# Patient Record
Sex: FEMALE | Race: WHITE | NOT HISPANIC OR LATINO
[De-identification: names, ages, dates, MRNs, and addresses within clinical notes are randomized per-mention and may not be internally consistent; named-entity substitution may affect disease eponyms.]

---

## 2020-08-14 ENCOUNTER — APPOINTMENT (OUTPATIENT)
Dept: MRI IMAGING | Facility: CLINIC | Age: 25
End: 2020-08-14

## 2020-12-09 ENCOUNTER — OUTPATIENT (OUTPATIENT)
Dept: OUTPATIENT SERVICES | Facility: HOSPITAL | Age: 25
LOS: 1 days | End: 2020-12-09
Payer: COMMERCIAL

## 2020-12-09 VITALS
DIASTOLIC BLOOD PRESSURE: 82 MMHG | SYSTOLIC BLOOD PRESSURE: 120 MMHG | HEIGHT: 69 IN | OXYGEN SATURATION: 100 % | TEMPERATURE: 99 F | WEIGHT: 171.08 LBS | HEART RATE: 76 BPM | RESPIRATION RATE: 18 BRPM

## 2020-12-09 DIAGNOSIS — Z01.818 ENCOUNTER FOR OTHER PREPROCEDURAL EXAMINATION: ICD-10-CM

## 2020-12-09 DIAGNOSIS — M24.271 DISORDER OF LIGAMENT, RIGHT ANKLE: ICD-10-CM

## 2020-12-09 DIAGNOSIS — M25.671 STIFFNESS OF RIGHT ANKLE, NOT ELSEWHERE CLASSIFIED: ICD-10-CM

## 2020-12-09 LAB
HCT VFR BLD CALC: 40.1 % — SIGNIFICANT CHANGE UP (ref 34.5–45)
HGB BLD-MCNC: 13.5 G/DL — SIGNIFICANT CHANGE UP (ref 11.5–15.5)
MCHC RBC-ENTMCNC: 28.9 PG — SIGNIFICANT CHANGE UP (ref 27–34)
MCHC RBC-ENTMCNC: 33.7 GM/DL — SIGNIFICANT CHANGE UP (ref 32–36)
MCV RBC AUTO: 85.9 FL — SIGNIFICANT CHANGE UP (ref 80–100)
NRBC # BLD: 0 /100 WBCS — SIGNIFICANT CHANGE UP (ref 0–0)
PLATELET # BLD AUTO: 294 K/UL — SIGNIFICANT CHANGE UP (ref 150–400)
RBC # BLD: 4.67 M/UL — SIGNIFICANT CHANGE UP (ref 3.8–5.2)
RBC # FLD: 11.8 % — SIGNIFICANT CHANGE UP (ref 10.3–14.5)
WBC # BLD: 6.84 K/UL — SIGNIFICANT CHANGE UP (ref 3.8–10.5)
WBC # FLD AUTO: 6.84 K/UL — SIGNIFICANT CHANGE UP (ref 3.8–10.5)

## 2020-12-09 PROCEDURE — 85027 COMPLETE CBC AUTOMATED: CPT

## 2020-12-09 PROCEDURE — G0463: CPT

## 2020-12-09 RX ORDER — LIDOCAINE HCL 20 MG/ML
0.2 VIAL (ML) INJECTION ONCE
Refills: 0 | Status: DISCONTINUED | OUTPATIENT
Start: 2020-12-18 | End: 2021-01-02

## 2020-12-09 RX ORDER — CEFAZOLIN SODIUM 1 G
2000 VIAL (EA) INJECTION ONCE
Refills: 0 | Status: DISCONTINUED | OUTPATIENT
Start: 2020-12-18 | End: 2021-01-02

## 2020-12-09 RX ORDER — CHLORHEXIDINE GLUCONATE 213 G/1000ML
1 SOLUTION TOPICAL ONCE
Refills: 0 | Status: DISCONTINUED | OUTPATIENT
Start: 2020-12-18 | End: 2021-01-02

## 2020-12-09 RX ORDER — SODIUM CHLORIDE 9 MG/ML
3 INJECTION INTRAMUSCULAR; INTRAVENOUS; SUBCUTANEOUS EVERY 8 HOURS
Refills: 0 | Status: DISCONTINUED | OUTPATIENT
Start: 2020-12-18 | End: 2021-01-02

## 2020-12-09 NOTE — H&P PST ADULT - ASSESSMENT
DEVANTEI VTE 2.0 SCORE [CLOT updated 2019]    AGE RELATED RISK FACTORS                                                       MOBILITY RELATED FACTORS  [ ] Age 41-60 years                                            (1 Point)                    [ ] Bed rest                                                        (1 Point)  [ ] Age: 61-74 years                                           (2 Points)                  [ ] Plaster cast                                                   (2 Points)  [ ] Age= 75 years                                              (3 Points)                    [ ] Bed bound for more than 72 hours                 (2 Points)    DISEASE RELATED RISK FACTORS                                               GENDER SPECIFIC FACTORS  [ ] Edema in the lower extremities                       (1 Point)              [ ] Pregnancy                                                     (1 Point)  [ ] Varicose veins                                               (1 Point)                     [ ] Post-partum < 6 weeks                                   (1 Point)             [ ] BMI > 25 Kg/m2                                            (1 Point)                     [ ] Hormonal therapy  or oral contraception          (1 Point)                 [ ] Sepsis (in the previous month)                        (1 Point)               [ ] History of pregnancy complications                 (1 point)  [ ] Pneumonia or serious lung disease                                               [ ] Unexplained or recurrent                     (1 Point)           (in the previous month)                               (1 Point)  [ ] Abnormal pulmonary function test                     (1 Point)                 SURGERY RELATED RISK FACTORS  [ ] Acute myocardial infarction                              (1 Point)               [ ]  Section                                             (1 Point)  [ ] Congestive heart failure (in the previous month)  (1 Point)      [ ] Minor surgery                                                  (1 Point)   [ ] Inflammatory bowel disease                             (1 Point)               [ ] Arthroscopic surgery                                        (2 Points)  [ ] Central venous access                                      (2 Points)                [ ] General surgery lasting more than 45 minutes (2 points)  [ ] Malignancy- Present or previous                   (2 Points)                [ ] Elective arthroplasty                                         (5 points)    [ ] Stroke (in the previous month)                          (5 Points)                                                                                                                                                           HEMATOLOGY RELATED FACTORS                                                 TRAUMA RELATED RISK FACTORS  [ ] Prior episodes of VTE                                     (3 Points)                [ ] Fracture of the hip, pelvis, or leg                       (5 Points)  [ ] Positive family history for VTE                         (3 Points)             [ ] Acute spinal cord injury (in the previous month)  (5 Points)  [ ] Prothrombin 42153 A                                     (3 Points)               [ ] Paralysis  (less than 1 month)                             (5 Points)  [ ] Factor V Leiden                                             (3 Points)                  [ ] Multiple Trauma within 1 month                        (5 Points)  [ ] Lupus anticoagulants                                     (3 Points)                                                           [ ] Anticardiolipin antibodies                               (3 Points)                                                       [ ] High homocysteine in the blood                      (3 Points)                                             [ ] Other congenital or acquired thrombophilia      (3 Points)                                                [ ] Heparin induced thrombocytopenia                  (3 Points)                                     Total Score [     1     ]

## 2020-12-09 NOTE — H&P PST ADULT - HEMATOLOGY/LYMPHATICS
pt is comfortable on vent  no new sx   pt is full code   cont current tx  overall prognosis poor negative

## 2020-12-09 NOTE — H&P PST ADULT - ATTENDING COMMENTS
This patient presents today with chronically painful and unstable right ankle 2 years duration after injury during tennis with MRI findings of pathology involving the anterior talofibular ligament with physical exam findings matching this area.  Plan for open lateral ankle ligament restabilization via direct repair of the ATFL.  No guarantees given nor implied regarding outcome, all risks benefits and alternatives explained clearly.  All questions answered and explained to her full level of satisfaction and understanding.

## 2020-12-09 NOTE — H&P PST ADULT - NSICDXPROBLEM_GEN_ALL_CORE_FT
PROBLEM DIAGNOSES  Problem: Disorder of ligament, right ankle  Assessment and Plan: Right lateral ankle ligament restabilization insertion of arthrex brace

## 2020-12-09 NOTE — H&P PST ADULT - HISTORY OF PRESENT ILLNESS
This is a 26 y/o female with no significant PMH, sprained right ankle while playing tennis 2 years ago, continues to have swelling of the ankle.  Presents today for right lateral ankle ligament retstabilization, insertion of arthrex brace.

## 2020-12-09 NOTE — H&P PST ADULT - NSANTHOSAYNRD_GEN_A_CORE
No. NELLY screening performed.  STOP BANG Legend: 0-2 = LOW Risk; 3-4 = INTERMEDIATE Risk; 5-8 = HIGH Risk

## 2020-12-15 PROBLEM — Z78.9 OTHER SPECIFIED HEALTH STATUS: Chronic | Status: ACTIVE | Noted: 2020-12-09

## 2020-12-16 ENCOUNTER — TRANSCRIPTION ENCOUNTER (OUTPATIENT)
Age: 25
End: 2020-12-16

## 2020-12-17 ENCOUNTER — TRANSCRIPTION ENCOUNTER (OUTPATIENT)
Age: 25
End: 2020-12-17

## 2020-12-18 ENCOUNTER — OUTPATIENT (OUTPATIENT)
Dept: OUTPATIENT SERVICES | Facility: HOSPITAL | Age: 25
LOS: 1 days | End: 2020-12-18
Payer: COMMERCIAL

## 2020-12-18 VITALS
HEIGHT: 69 IN | SYSTOLIC BLOOD PRESSURE: 111 MMHG | OXYGEN SATURATION: 100 % | DIASTOLIC BLOOD PRESSURE: 70 MMHG | TEMPERATURE: 97 F | WEIGHT: 171.08 LBS | RESPIRATION RATE: 20 BRPM | HEART RATE: 83 BPM

## 2020-12-18 VITALS
RESPIRATION RATE: 14 BRPM | OXYGEN SATURATION: 100 % | HEART RATE: 98 BPM | SYSTOLIC BLOOD PRESSURE: 121 MMHG | DIASTOLIC BLOOD PRESSURE: 67 MMHG

## 2020-12-18 DIAGNOSIS — M24.271 DISORDER OF LIGAMENT, RIGHT ANKLE: ICD-10-CM

## 2020-12-18 PROCEDURE — C1713: CPT

## 2020-12-18 PROCEDURE — 27695 REPAIR OF ANKLE LIGAMENT: CPT | Mod: RT

## 2020-12-18 RX ORDER — ONDANSETRON 8 MG/1
4 TABLET, FILM COATED ORAL ONCE
Refills: 0 | Status: COMPLETED | OUTPATIENT
Start: 2020-12-18 | End: 2020-12-18

## 2020-12-18 RX ORDER — NORETHINDRONE AND ETHINYL ESTRADIOL 0.4-0.035
1 KIT ORAL
Qty: 0 | Refills: 0 | DISCHARGE

## 2020-12-18 RX ORDER — OXYCODONE HYDROCHLORIDE 5 MG/1
5 TABLET ORAL ONCE
Refills: 0 | Status: DISCONTINUED | OUTPATIENT
Start: 2020-12-18 | End: 2020-12-18

## 2020-12-18 RX ORDER — SODIUM CHLORIDE 9 MG/ML
1000 INJECTION, SOLUTION INTRAVENOUS
Refills: 0 | Status: DISCONTINUED | OUTPATIENT
Start: 2020-12-18 | End: 2021-01-02

## 2020-12-18 RX ADMIN — ONDANSETRON 4 MILLIGRAM(S): 8 TABLET, FILM COATED ORAL at 16:50

## 2020-12-18 NOTE — ASU DISCHARGE PLAN (ADULT/PEDIATRIC) - ASU DC SPECIAL INSTRUCTIONSFT
Non weight bearing to right lower extremity. Keep splint clean, dry , and intact until follow up appointment. Follow up with Dr. Espinosa as scheduled.

## 2020-12-18 NOTE — ASU DISCHARGE PLAN (ADULT/PEDIATRIC) - NURSING INSTRUCTIONS
Tylenol/acetaminophen------AND/OR------Motrin/ibuprofen  as needed for pain/discomfort.  NEXT DOSE:  Tylenol  OR  Tylenol-containing medication  OK @  9:00pm this evening, if needed.  No weight bearing on R foot; use crutches.  Follow up with MD as requested; call office for appointment.

## 2020-12-18 NOTE — PRE-ANESTHESIA EVALUATION ADULT - NSPREOPDXFT_GEN_ALL_CORE
M24.271, disorder of ligament right ankle; M25.572, pain in right ankle; S93.401A, sprain unspec. ligament, right ankle

## 2021-08-14 ENCOUNTER — TRANSCRIPTION ENCOUNTER (OUTPATIENT)
Age: 26
End: 2021-08-14
